# Patient Record
Sex: FEMALE | Race: WHITE | HISPANIC OR LATINO
[De-identification: names, ages, dates, MRNs, and addresses within clinical notes are randomized per-mention and may not be internally consistent; named-entity substitution may affect disease eponyms.]

---

## 2018-10-22 ENCOUNTER — RX ONLY (OUTPATIENT)
Age: 43
Setting detail: RX ONLY
End: 2018-10-22

## 2018-10-22 ENCOUNTER — APPOINTMENT (RX ONLY)
Dept: URBAN - METROPOLITAN AREA CLINIC 15 | Facility: CLINIC | Age: 43
Setting detail: DERMATOLOGY
End: 2018-10-22

## 2018-10-22 DIAGNOSIS — L71.8 OTHER ROSACEA: ICD-10-CM

## 2018-10-22 DIAGNOSIS — L82.0 INFLAMED SEBORRHEIC KERATOSIS: ICD-10-CM

## 2018-10-22 DIAGNOSIS — Z41.9 ENCOUNTER FOR PROCEDURE FOR PURPOSES OTHER THAN REMEDYING HEALTH STATE, UNSPECIFIED: ICD-10-CM

## 2018-10-22 DIAGNOSIS — L28.1 PRURIGO NODULARIS: ICD-10-CM

## 2018-10-22 PROCEDURE — ? TREATMENT REGIMEN

## 2018-10-22 PROCEDURE — ? COUNSELING

## 2018-10-22 PROCEDURE — 17110 DESTRUCTION B9 LES UP TO 14: CPT | Mod: NC

## 2018-10-22 PROCEDURE — ? BENIGN DESTRUCTION

## 2018-10-22 PROCEDURE — ? MEDICAL CONSULTATION: MELASMA

## 2018-10-22 RX ORDER — AZELAIC ACID 0.15 G/G
AEROSOL, FOAM TOPICAL
Qty: 1 | Refills: 1 | Status: ERX | COMMUNITY
Start: 2018-10-22

## 2018-10-22 RX ORDER — SULFACETAMIDE SODIUM, SULFUR 100; 50 MG/G; MG/G
EMULSION TOPICAL
Qty: 1 | Refills: 2 | Status: ERX | COMMUNITY
Start: 2018-10-22

## 2018-10-22 ASSESSMENT — LOCATION DETAILED DESCRIPTION DERM
LOCATION DETAILED: RIGHT INFERIOR CENTRAL MALAR CHEEK
LOCATION DETAILED: RIGHT LATERAL MALAR CHEEK
LOCATION DETAILED: LEFT INFERIOR CENTRAL MALAR CHEEK
LOCATION DETAILED: LEFT CENTRAL MALAR CHEEK

## 2018-10-22 ASSESSMENT — LOCATION SIMPLE DESCRIPTION DERM
LOCATION SIMPLE: LEFT CHEEK
LOCATION SIMPLE: RIGHT CHEEK

## 2018-10-22 ASSESSMENT — LOCATION ZONE DERM: LOCATION ZONE: FACE

## 2018-10-22 NOTE — PROCEDURE: TREATMENT REGIMEN
Initiate Treatment: Avar cleanser wash face in the morning \\nFinacea foam in the morning \\nCerave Pm moisturizer \\nStrict sunscreen in the morning ( Zinc + titanium 13% or higher)\\n\\nBaby wash head to toes by Tristan Quinteros and Tristan Quinteros \\nHydroquinone 6% cream at night ( Avoid corners of the mouth, nose and eyes)\\nCerave PM moisturizer
Detail Level: Zone

## 2018-10-22 NOTE — PROCEDURE: BENIGN DESTRUCTION
Medical Necessity Clause: This procedure was medically necessary because the lesions that were treated were irritated and lichenified.
Render Post-Care Instructions In Note?: yes
Detail Level: Zone
Add 52 Modifier (Optional): no
Anesthesia Volume In Cc: 0.5
Total Number Of Lesions Treated: 2
Post-Care Instructions: I reviewed with the patient in detail post-care instructions. Patient is to wear sunprotection, and avoid picking at any of the treated lesions. Pt may apply Vaseline to crusted or scabbing areas.
Medical Necessity Information: It is in your best interest to select a reason for this procedure from the list below. All of these items fulfill various CMS LCD requirements except the new and changing color options.
Consent: The patient's consent was obtained including but not limited to risks of crusting, scabbing, blistering, scarring, darker or lighter pigmentary change, recurrence, incomplete removal and infection.

## 2023-05-01 ENCOUNTER — APPOINTMENT (RX ONLY)
Dept: URBAN - METROPOLITAN AREA CLINIC 23 | Facility: CLINIC | Age: 48
Setting detail: DERMATOLOGY
End: 2023-05-01

## 2023-05-01 DIAGNOSIS — Z41.9 ENCOUNTER FOR PROCEDURE FOR PURPOSES OTHER THAN REMEDYING HEALTH STATE, UNSPECIFIED: ICD-10-CM

## 2023-05-01 PROCEDURE — ? RECOMMENDATIONS

## 2023-05-01 PROCEDURE — ? DYSPORT

## 2023-05-01 PROCEDURE — ? IN-HOUSE DISPENSING PHARMACY

## 2023-05-01 NOTE — PROCEDURE: DYSPORT
Additional Area 6 Units: 0
Show Periorbital Units: Yes
Show Inventory Tab: Show
Consent: Written consent obtained. Risks include but not limited to lid/brow ptosis, bruising, swelling, diplopia, temporary effect, incomplete chemical denervation.
Show Ucl Units: No
Lot #: [id number]
Additional Area 1 Location: neck bands
Glabellar Complex Units: [address]
Detail Level: Simple
Additional Area 5 Location: glabella
Forehead Units: [address] [facility], [place], operated [facility]
Dilution (U/ 0.1cc): 1.5
Additional Area 4 Location: 2cm high forehead
Expiration Date (Month Year): 2022-08
Price (Use Numbers Only, No Special Characters Or $): 0.00
Additional Area 2 Location: crows feet

## 2023-05-01 NOTE — PROCEDURE: RECOMMENDATIONS
Recommendations (Free Text): TEDFTNET:\\[id number] without retin-a\\nBrightening pads \\nCyspera system \\nAlsatin tinted sunscreen \\nProcedures:\\nBotox \\nSkin tag removal under eyes \\nSebaceous hyperplasia removal
Render Risk Assessment In Note?: no
Detail Level: Detailed
Recommendation Preamble: The following recommendations were made during the visit:

## 2023-05-01 NOTE — PROCEDURE: IN-HOUSE DISPENSING PHARMACY
Product 2 Unit Type: tablets
Product 25 Refills: 0
Product 74 Unit Type: mg
Product 13 Amount/Unit (Numbers Only): 1
Name Of Product 3: Tretinoin 0.05% cream
Name Of Product 7: Latisse 5ml
Product 11 Application Directions: Apply a thin layer to the acne prone areas in the AM
Product 11 Unit Type: bottle(s)
Product 22 Application Directions: Apply to the affected area of the face
Product 3 Application Directions: Apply a pea size amount to the entire face at night
Product 7 Application Directions: Apply to lashes at bedtime daily as indicated
Name Of Product 12: Retinol Lite
Product 7 Unit Type: ml
Product 3 Unit Type: grams
Name Of Product 22: Brightening Pads
Name Of Product 6: Prednisone 20mg
Product 1 Unit Type: tube(s)
Send Charges To Patient Encounter: No
Name Of Product 2: Valtrex
Name Of Product 15: Brightening Pads 4%
Product 10 Application Directions: Take 1 tablet 2 times daily for 3 days
Product 6 Application Directions: Take one tablet for swelling today at the office as indicated and one tablet tomorrow
Name Of Product 19: Diazepam 5mg
Product 2 Application Directions: apply to affected areas left lower leg am and pm  as indicated
Product 15 Application Directions: Apply to face in the morning
Name Of Product 11: Clearing gel
Name Of Product 1: Hydroquinone 4% / Tretinoin 0.05% / Fluocinolone 0.01%
Product 15 Unit Type: jar(s)
Product 13 Application Directions: Apply to the entire face in the Am and pm
Name Of Product 9: Clearing tone pads
Name Of Product 5: loratadine 10 mg
Product 9 Application Directions: Apply to the acne prone areas in the Am and Pm
Name Of Product 14: Skin Better Interfuse eye treatment cream
Product 5 Application Directions: Take one tablet today after procedure with full glass of water as indicated
Product 7 Amount/Unit (Numbers Only): 5
Product 1 Application Directions: Apply thin layer to right cheek every night  at bedtime only.
Product 3 Amount/Unit (Numbers Only): [facility]
Detail Level: Zone
Product 14 Application Directions: Apply to the under eyes in the Am and pm
Name Of Product 10: Valtrex 500 mg
Name Of Product 8: TNS Essential
Name Of Product 4: Valium 5 mg
Product 12 Application Directions: Apply a pea size amount to the entire face at bedtime.
Product 21 Application Directions: Take one tablet 30 minutes prior to procedure
Product 2 Price/Unit (In Dollars): 0.00
Name Of Product 13: Skin Better Even tone correcting serum
Product 4 Application Directions: Take one tablet half hour today prior to procedure as indicated.  Dispense in office
Product 8 Application Directions: Apply to the clean face in the AM and PM daily
Name Of Product 21: Valium 5mg
Product 2 Amount/Unit (Numbers Only): 6

## 2023-06-05 ENCOUNTER — APPOINTMENT (RX ONLY)
Dept: URBAN - METROPOLITAN AREA CLINIC 23 | Facility: CLINIC | Age: 48
Setting detail: DERMATOLOGY
End: 2023-06-05

## 2023-06-05 DIAGNOSIS — L81.4 OTHER MELANIN HYPERPIGMENTATION: ICD-10-CM

## 2023-06-05 DIAGNOSIS — L73.8 OTHER SPECIFIED FOLLICULAR DISORDERS: ICD-10-CM

## 2023-06-05 PROCEDURE — ? ELECTRODESICCATION

## 2023-06-05 PROCEDURE — 99212 OFFICE O/P EST SF 10 MIN: CPT | Mod: 25

## 2023-06-05 PROCEDURE — ? SUNSCREEN RECOMMENDATIONS

## 2023-06-05 PROCEDURE — 17110 DESTRUCTION B9 LES UP TO 14: CPT

## 2023-06-05 PROCEDURE — ? COUNSELING

## 2023-06-05 ASSESSMENT — LOCATION DETAILED DESCRIPTION DERM
LOCATION DETAILED: LEFT CENTRAL MALAR CHEEK
LOCATION DETAILED: RIGHT INFERIOR MEDIAL FOREHEAD
LOCATION DETAILED: RIGHT INFERIOR LATERAL FOREHEAD
LOCATION DETAILED: RIGHT INFERIOR FOREHEAD
LOCATION DETAILED: LEFT LATERAL MALAR CHEEK
LOCATION DETAILED: LEFT SUPERIOR CENTRAL MALAR CHEEK

## 2023-06-05 ASSESSMENT — LOCATION SIMPLE DESCRIPTION DERM
LOCATION SIMPLE: LEFT CHEEK
LOCATION SIMPLE: RIGHT FOREHEAD

## 2023-06-05 ASSESSMENT — LOCATION ZONE DERM: LOCATION ZONE: FACE

## 2023-06-05 NOTE — PROCEDURE: ELECTRODESICCATION
Medical Necessity Information: It is in your best interest to select a reason for this procedure from the list below. All of these items fulfill various CMS LCD requirements except the new and changing color options.
Post-Care Instructions: I reviewed with the patient in detail post-care instructions. Patient is to wear sunprotection, and avoid picking at any of the treated lesions.  Pt may apply Vaseline to crusted or scabbing areas
Consent: The patient's consent was obtained including but not limited to risks of crusting, scabbing, blistering, scarring, darker or lighter pigmentary change, recurrence, incomplete removal and infection.
Detail Level: Simple
Include Z78.9 (Other Specified Conditions Influencing Health Status) As An Associated Diagnosis?: No
Medical Necessity Clause: This procedure was medically necessary because the lesions that were treated were:
Anesthesia Type: 1% lidocaine with epinephrine

## 2023-11-09 ENCOUNTER — APPOINTMENT (RX ONLY)
Dept: URBAN - METROPOLITAN AREA CLINIC 23 | Facility: CLINIC | Age: 48
Setting detail: DERMATOLOGY
End: 2023-11-09

## 2023-11-09 DIAGNOSIS — L73.8 OTHER SPECIFIED FOLLICULAR DISORDERS: ICD-10-CM

## 2023-11-09 DIAGNOSIS — D22 MELANOCYTIC NEVI: ICD-10-CM

## 2023-11-09 DIAGNOSIS — Z41.9 ENCOUNTER FOR PROCEDURE FOR PURPOSES OTHER THAN REMEDYING HEALTH STATE, UNSPECIFIED: ICD-10-CM

## 2023-11-09 DIAGNOSIS — L81.4 OTHER MELANIN HYPERPIGMENTATION: ICD-10-CM

## 2023-11-09 DIAGNOSIS — L81.1 CHLOASMA: ICD-10-CM

## 2023-11-09 PROBLEM — D22.5 MELANOCYTIC NEVI OF TRUNK: Status: ACTIVE | Noted: 2023-11-09

## 2023-11-09 PROCEDURE — ? SUNSCREEN RECOMMENDATIONS

## 2023-11-09 PROCEDURE — ? DYSPORT

## 2023-11-09 PROCEDURE — 99213 OFFICE O/P EST LOW 20 MIN: CPT

## 2023-11-09 PROCEDURE — ? COUNSELING

## 2023-11-09 ASSESSMENT — LOCATION DETAILED DESCRIPTION DERM
LOCATION DETAILED: LEFT CENTRAL PARIETAL SCALP
LOCATION DETAILED: LEFT SUPERIOR UPPER BACK
LOCATION DETAILED: LEFT INFERIOR CENTRAL MALAR CHEEK
LOCATION DETAILED: LEFT INFERIOR MEDIAL MALAR CHEEK
LOCATION DETAILED: EPIGASTRIC SKIN
LOCATION DETAILED: RIGHT FOREHEAD

## 2023-11-09 ASSESSMENT — LOCATION SIMPLE DESCRIPTION DERM
LOCATION SIMPLE: SCALP
LOCATION SIMPLE: LEFT UPPER BACK
LOCATION SIMPLE: ABDOMEN
LOCATION SIMPLE: LEFT CHEEK
LOCATION SIMPLE: RIGHT FOREHEAD

## 2023-11-09 ASSESSMENT — LOCATION ZONE DERM
LOCATION ZONE: SCALP
LOCATION ZONE: FACE
LOCATION ZONE: TRUNK

## 2023-11-09 NOTE — PROCEDURE: SUNSCREEN RECOMMENDATIONS

## 2023-11-09 NOTE — PROCEDURE: DYSPORT
Show Topical Anesthesia: Yes
Masseter Units: 0
Additional Area 3 Location: glabella
Dilution (U/ 0.1cc): 1.5
Additional Area 4 Location: 2cm high forehead
Show Ucl Units: No
Expiration Date (Month Year): 2022-08
Price (Use Numbers Only, No Special Characters Or $): 0.00
Additional Area 6 Location: lateral brows
Additional Area 1 Location: neck bands
Glabellar Complex Units: [address]
Additional Area 6 Units: 10
Additional Area 2 Location: crows feet
Detail Level: Simple
Show Inventory Tab: Show
Lot #: [id number]
Consent: Written consent obtained. Risks include but not limited to lid/brow ptosis, bruising, swelling, diplopia, temporary effect, incomplete chemical denervation.

## 2024-02-12 ENCOUNTER — APPOINTMENT (RX ONLY)
Dept: URBAN - METROPOLITAN AREA CLINIC 23 | Facility: CLINIC | Age: 49
Setting detail: DERMATOLOGY
End: 2024-02-12

## 2024-02-12 DIAGNOSIS — L73.8 OTHER SPECIFIED FOLLICULAR DISORDERS: ICD-10-CM

## 2024-02-12 DIAGNOSIS — Z41.9 ENCOUNTER FOR PROCEDURE FOR PURPOSES OTHER THAN REMEDYING HEALTH STATE, UNSPECIFIED: ICD-10-CM

## 2024-02-12 PROCEDURE — ? ELECTRODESICCATION

## 2024-02-12 PROCEDURE — 17110 DESTRUCTION B9 LES UP TO 14: CPT

## 2024-02-12 PROCEDURE — ? DYSPORT

## 2024-02-12 PROCEDURE — ? COUNSELING

## 2024-02-12 ASSESSMENT — LOCATION DETAILED DESCRIPTION DERM
LOCATION DETAILED: LEFT MEDIAL MALAR CHEEK
LOCATION DETAILED: RIGHT INFERIOR LATERAL FOREHEAD
LOCATION DETAILED: RIGHT MEDIAL FOREHEAD
LOCATION DETAILED: LEFT INFERIOR MEDIAL MALAR CHEEK
LOCATION DETAILED: RIGHT INFERIOR CENTRAL MALAR CHEEK
LOCATION DETAILED: RIGHT MEDIAL MALAR CHEEK
LOCATION DETAILED: LEFT CENTRAL PARIETAL SCALP

## 2024-02-12 ASSESSMENT — LOCATION SIMPLE DESCRIPTION DERM
LOCATION SIMPLE: LEFT CHEEK
LOCATION SIMPLE: SCALP
LOCATION SIMPLE: RIGHT CHEEK
LOCATION SIMPLE: RIGHT FOREHEAD

## 2024-02-12 ASSESSMENT — LOCATION ZONE DERM
LOCATION ZONE: FACE
LOCATION ZONE: SCALP

## 2024-02-12 NOTE — PROCEDURE: DYSPORT
Show Topical Anesthesia: Yes
Masseter Units: 0
Additional Area 3 Location: glabella
Dilution (U/0.1 Cc): 1.5
Additional Area 4 Location: 2cm high forehead
Show Ucl Units: No
Expiration Date (Month Year): 2022-08
Price (Use Numbers Only, No Special Characters Or $): 0.00
Additional Area 6 Location: lateral brows
Additional Area 1 Location: neck bands
Glabellar Complex Units: 50
Additional Area 6 Units: 10
Additional Area 2 Location: crows feet
Detail Level: Simple
Show Inventory Tab: Show
Lot #: [id number]
Consent: Written consent obtained. Risks include but not limited to lid/brow ptosis, bruising, swelling, diplopia, temporary effect, incomplete chemical denervation.

## 2024-05-13 ENCOUNTER — APPOINTMENT (RX ONLY)
Dept: URBAN - METROPOLITAN AREA CLINIC 23 | Facility: CLINIC | Age: 49
Setting detail: DERMATOLOGY
End: 2024-05-13

## 2024-05-13 DIAGNOSIS — Z41.9 ENCOUNTER FOR PROCEDURE FOR PURPOSES OTHER THAN REMEDYING HEALTH STATE, UNSPECIFIED: ICD-10-CM

## 2024-05-13 PROCEDURE — ? DYSPORT

## 2024-05-13 PROCEDURE — ? ADDITIONAL NOTES

## 2024-05-13 NOTE — PROCEDURE: DYSPORT
Show Inventory Tab: Show
Depressor Anguli Oris Units: 0
Additional Area 1 Location: lateral brows
Glabellar Complex Units: 50
Detail Level: Simple
Additional Area 1 Units: 10
Consent: Written consent obtained. Risks include but not limited to lid/brow ptosis, bruising, swelling, diplopia, temporary effect, incomplete chemical denervation.
Show Forehead Units: Yes
Lot #: [id number]
Dilution (U/0.1 Cc): 3
Post-Care Instructions: Patient instructed to not lie down for 4 hours and limit physical activity for 24 hours. Patient instructed not to travel by airplane for 48 hours.
Expiration Date (Month Year): 07/31/2019
Show Right And Left Brow Units: No

## 2024-05-13 NOTE — PROCEDURE: ADDITIONAL NOTES
Render Risk Assessment In Note?: no
Detail Level: Zone
Additional Notes: [place]\\n\\n[place] laser $350 for spots, full face $700\\n\\ntramexamic acid for melasma